# Patient Record
(demographics unavailable — no encounter records)

---

## 2025-02-01 NOTE — HISTORY OF PRESENT ILLNESS
[de-identified] : HAWA GALEANA is a 53 year F [employer] Employee who presents today to discuss her anxiety. She says that she is very anxious lately, appears to be having difficulty getting along with her co-workers. She describes them as passive-aggressive. She says that her ETOH consumption has increased- and often drinks alone at home. She does not have access to a car right now. She states that her license/registration was pulled for failure to have auto insurance in place. Sometimes she is late to work because of difficulty getting a cab. This stresses her out too. She has never been medicated for anxiety, but has an interest in starting anxiety medications.  A says she also concerned about her balance-as it is not good - she slipped in the icy parking lot last week.

## 2025-02-01 NOTE — COUNSELING
[Hazards of at-risk alcohol use discussed] : Hazards of at-risk alcohol use discussed [Support options provided] : Support options provided [Participate in Treatment Program] : Participate in treatment program [FreeTextEntry2] : She is a [employer] Employee- I have referred her to [facility] -I printed out the Brochure with the contact info- I have explained to her that Substance abuse programs are available thru [facility] and that she should not be ashamed as these are common issues dealt with [facility] on a regular basis. I did not advise her to immediately stop ETOH consumption for fear of triggering withdrawal- I have advised that she arrange  counseling via [facility] prior to d/c of ETOH.  [FreeTextEntry1] : 15

## 2025-02-01 NOTE — HISTORY OF PRESENT ILLNESS
[de-identified] : HAWA GALEANA is a 53 year F [employer] Employee who presents today to discuss her anxiety. She says that she is very anxious lately, appears to be having difficulty getting along with her co-workers. She describes them as passive-aggressive. She says that her ETOH consumption has increased- and often drinks alone at home. She does not have access to a car right now. She states that her license/registration was pulled for failure to have auto insurance in place. Sometimes she is late to work because of difficulty getting a cab. This stresses her out too. She has never been medicated for anxiety, but has an interest in starting anxiety medications.  A says she also concerned about her balance-as it is not good - she slipped in the icy parking lot last week.

## 2025-02-01 NOTE — PHYSICAL EXAM
[Normal] : no jugular venous distention, supple, no lymphadenopathy and the thyroid was normal and there were no nodules present [No Respiratory Distress] : no respiratory distress  [No Accessory Muscle Use] : no accessory muscle use [Normal Gait] : normal gait [de-identified] : I had her walk up and down the corridors-I did not detect a balance disorder- normal swing to her arms. No unstable gait noted

## 2025-02-01 NOTE — PHYSICAL EXAM
[Normal] : no jugular venous distention, supple, no lymphadenopathy and the thyroid was normal and there were no nodules present [No Respiratory Distress] : no respiratory distress  [No Accessory Muscle Use] : no accessory muscle use [Normal Gait] : normal gait [de-identified] : I had her walk up and down the corridors-I did not detect a balance disorder- normal swing to her arms. No unstable gait noted

## 2025-02-01 NOTE — COUNSELING
[Hazards of at-risk alcohol use discussed] : Hazards of at-risk alcohol use discussed [Support options provided] : Support options provided [Participate in Treatment Program] : Participate in treatment program [FreeTextEntry2] : She is a [employer] Employee- I have referred her to [facility] -I printed out the Brochure with the contact info- I have explained to her that Substance abuse programs are available thru [facility] and that she should not be ashamed as these are common issues dealt with [facility] on a regular basis. I did not advise her to immediately stop ETOH consumption for fear of triggering withdrawal- I have advised that she arrange  counseling via [facility] prior to d/c of ETOH.  [FreeTextEntry1] : 15

## 2025-07-24 NOTE — DISCUSSION/SUMMARY
[de-identified] : Right X-Ray Examination of the KNEE (4 views): there are no fractures, subluxations or dislocations.      Assessment:   The patient is a 54-year-old female with physical exam findings consistent with RIGHT KNEE INTERNAL DERANGEMENT  - We discussed their diagnosis and treatment options at length including both surgical and nonsurgical options, r/b/a, recovery and outcomes of each. - - - MRI R knee to evaluate insufficiency fracture/ occult injury - Continue to minimize WB with walker - Ice as reviewed - Discussed possibility of CSI injections in the future.     Follow up after MRI for review.

## 2025-07-24 NOTE — PHYSICAL EXAM
[Right] : right knee [Left] : left knee [] : no calf tenderness [de-identified] : right knee brace - immobilizer [de-identified] : extension 3 degrees

## 2025-07-24 NOTE — DISCUSSION/SUMMARY
[de-identified] : Right X-Ray Examination of the KNEE (4 views): there are no fractures, subluxations or dislocations.      Assessment:   The patient is a 54-year-old female with physical exam findings consistent with RIGHT KNEE INTERNAL DERANGEMENT  - We discussed their diagnosis and treatment options at length including both surgical and nonsurgical options, r/b/a, recovery and outcomes of each. - - - MRI R knee to evaluate insufficiency fracture/ occult injury - Continue to minimize WB with walker - Ice as reviewed - Discussed possibility of CSI injections in the future.     Follow up after MRI for review.

## 2025-07-24 NOTE — HISTORY OF PRESENT ILLNESS
[de-identified] : 07/14/2025: The patient is a 54 year old F, who presents today complaining of  bilateral knee pain. Date of Injury/Onset: 7/4/2025 Mechanism of injury: pt hit her foot and was unable to weight bare a few days later This is not a Work Related Injury being treated under Worker's Compensation. This is not an athletic injury occurring associated with an interscholastic or organized sports team. Quality of symptoms: right anterior and posterior knee pain, left anterior knee pain, denies n/t Improves with: rest, tylenol (liquid 25 mg) Worse with: WB, walking, sleeping Prior treatment: [facility] - XR, walker, knee immobilizer (rt)  Prior imaging: XR ER 7/9/2024 Previous injury: None Out of work/sport: Yes, since date of injury School/Sport/Position/Occupation: [employer]  Additional Information: No prior hx of knee issues fmhx - arthritis (osteo-/rheum-)

## 2025-07-24 NOTE — PHYSICAL EXAM
[Right] : right knee [Left] : left knee [] : no calf tenderness [de-identified] : right knee brace - immobilizer [de-identified] : extension 3 degrees

## 2025-07-24 NOTE — HISTORY OF PRESENT ILLNESS
[de-identified] : 07/14/2025: The patient is a 54 year old F, who presents today complaining of  bilateral knee pain. Date of Injury/Onset: 7/4/2025 Mechanism of injury: pt hit her foot and was unable to weight bare a few days later This is not a Work Related Injury being treated under Worker's Compensation. This is not an athletic injury occurring associated with an interscholastic or organized sports team. Quality of symptoms: right anterior and posterior knee pain, left anterior knee pain, denies n/t Improves with: rest, tylenol (liquid 25 mg) Worse with: WB, walking, sleeping Prior treatment: [facility] - XR, walker, knee immobilizer (rt)  Prior imaging: XR ER 7/9/2024 Previous injury: None Out of work/sport: Yes, since date of injury School/Sport/Position/Occupation: [employer]  Additional Information: No prior hx of knee issues fmhx - arthritis (osteo-/rheum-)

## 2025-07-26 NOTE — DISCUSSION/SUMMARY
[de-identified] : The patient is a 54-year-old female with physical exam findings consistent with RIGHT KNEE INTERNAL DERANGEMENT  Assessment:  - We discussed their diagnosis and treatment options at length including both surgical and nonsurgical options, r/b/a, recovery and outcomes of each. - Patient could not get MRI due to claustrophobia. We discussed Valium rx to help get through MRI. New MRI rx provided, evaluate insufficiency fracture/ occult injury/ pathology. - Continue to minimize WB with walker and transition to cane as needed. - Ice as reviewed. - Discussed CSI today and patient denies.    - Discussed Voltaren gel.  Follow up after MRI for review or for CSI

## 2025-07-26 NOTE — DISCUSSION/SUMMARY
[de-identified] : The patient is a 54-year-old female with physical exam findings consistent with RIGHT KNEE INTERNAL DERANGEMENT  Assessment:  - We discussed their diagnosis and treatment options at length including both surgical and nonsurgical options, r/b/a, recovery and outcomes of each. - Patient could not get MRI due to claustrophobia. We discussed Valium rx to help get through MRI. New MRI rx provided, evaluate insufficiency fracture/ occult injury/ pathology. - Continue to minimize WB with walker and transition to cane as needed. - Ice as reviewed. - Discussed CSI today and patient denies.    - Discussed Voltaren gel.  Follow up after MRI for review or for CSI

## 2025-07-26 NOTE — HISTORY OF PRESENT ILLNESS
[de-identified] : 07/16/2025: Patient present today for follow-up right knee pain. They were unable to undergo MRI study due to claustrophobia. Since last visit, pt has been feeling better.  07/14/2025: The patient is a 54 year old F, who presents today complaining of  bilateral knee pain. Date of Injury/Onset: 7/4/2025 Mechanism of injury: pt hit her foot and was unable to weight bare a few days later This is not a Work Related Injury being treated under Worker's Compensation. This is not an athletic injury occurring associated with an interscholastic or organized sports team. Quality of symptoms: right anterior and posterior knee pain, left anterior knee pain, denies n/t Improves with: rest, tylenol (liquid 25 mg) Worse with: WB, walking, sleeping Prior treatment: [facility] - XR, walker, knee immobilizer (rt)  Prior imaging: XR ER 7/9/2024 Previous injury: None Out of work/sport: Yes, since date of injury School/Sport/Position/Occupation: [employer]  Additional Information: No prior hx of knee issues fmhx - arthritis (osteo-/rheum-)

## 2025-07-26 NOTE — PHYSICAL EXAM
[Right] : right knee [Left] : left knee [] : no calf tenderness [de-identified] : right knee brace - immobilizer [de-identified] : extension 3 degrees

## 2025-07-26 NOTE — HISTORY OF PRESENT ILLNESS
[de-identified] : 07/16/2025: Patient present today for follow-up right knee pain. They were unable to undergo MRI study due to claustrophobia. Since last visit, pt has been feeling better.  07/14/2025: The patient is a 54 year old F, who presents today complaining of  bilateral knee pain. Date of Injury/Onset: 7/4/2025 Mechanism of injury: pt hit her foot and was unable to weight bare a few days later This is not a Work Related Injury being treated under Worker's Compensation. This is not an athletic injury occurring associated with an interscholastic or organized sports team. Quality of symptoms: right anterior and posterior knee pain, left anterior knee pain, denies n/t Improves with: rest, tylenol (liquid 25 mg) Worse with: WB, walking, sleeping Prior treatment: [facility] - XR, walker, knee immobilizer (rt)  Prior imaging: XR ER 7/9/2024 Previous injury: None Out of work/sport: Yes, since date of injury School/Sport/Position/Occupation: [employer]  Additional Information: No prior hx of knee issues fmhx - arthritis (osteo-/rheum-)